# Patient Record
Sex: FEMALE | Race: WHITE | URBAN - METROPOLITAN AREA
[De-identification: names, ages, dates, MRNs, and addresses within clinical notes are randomized per-mention and may not be internally consistent; named-entity substitution may affect disease eponyms.]

---

## 2018-04-21 ENCOUNTER — TELEPHONE (OUTPATIENT)
Dept: OBGYN | Facility: CLINIC | Age: 28
End: 2018-04-21

## 2018-04-21 NOTE — TELEPHONE ENCOUNTER
4/21/2018    Call Regarding ReattributionPhysical    Attempt 1    Message     Comments: OZIEL FULL       Outreach   SB

## 2018-10-04 NOTE — TELEPHONE ENCOUNTER
10/4/2018    Call Regarding ReattributionPhysical    Attempt 3    Message VM FULL    Comments:       Outreach   CC

## 2025-04-22 ENCOUNTER — TRANSFERRED RECORDS (OUTPATIENT)
Dept: HEALTH INFORMATION MANAGEMENT | Facility: CLINIC | Age: 35
End: 2025-04-22
Payer: COMMERCIAL